# Patient Record
Sex: FEMALE | Race: WHITE | Employment: PART TIME | URBAN - METROPOLITAN AREA
[De-identification: names, ages, dates, MRNs, and addresses within clinical notes are randomized per-mention and may not be internally consistent; named-entity substitution may affect disease eponyms.]

---

## 2021-03-21 ENCOUNTER — HOSPITAL ENCOUNTER (EMERGENCY)
Age: 21
Discharge: HOME | End: 2021-03-21
Attending: EMERGENCY MEDICINE
Payer: COMMERCIAL

## 2021-03-21 ENCOUNTER — APPOINTMENT (OUTPATIENT)
Dept: GENERAL RADIOLOGY | Age: 21
End: 2021-03-21
Payer: COMMERCIAL

## 2021-03-21 VITALS
DIASTOLIC BLOOD PRESSURE: 80 MMHG | HEIGHT: 62 IN | OXYGEN SATURATION: 98 % | TEMPERATURE: 97.9 F | HEART RATE: 108 BPM | BODY MASS INDEX: 20.24 KG/M2 | RESPIRATION RATE: 20 BRPM | WEIGHT: 110 LBS | SYSTOLIC BLOOD PRESSURE: 132 MMHG

## 2021-03-21 DIAGNOSIS — S62.102A WRIST FRACTURE, CLOSED, LEFT, INITIAL ENCOUNTER: Primary | ICD-10-CM

## 2021-03-21 PROCEDURE — 6370000000 HC RX 637 (ALT 250 FOR IP): Performed by: EMERGENCY MEDICINE

## 2021-03-21 PROCEDURE — 99285 EMERGENCY DEPT VISIT HI MDM: CPT

## 2021-03-21 PROCEDURE — 73110 X-RAY EXAM OF WRIST: CPT

## 2021-03-21 RX ORDER — HYDROCODONE BITARTRATE AND ACETAMINOPHEN 5; 325 MG/1; MG/1
1 TABLET ORAL EVERY 6 HOURS PRN
Qty: 20 TABLET | Refills: 0 | Status: SHIPPED | OUTPATIENT
Start: 2021-03-21 | End: 2021-03-26

## 2021-03-21 RX ORDER — HYDROCODONE BITARTRATE AND ACETAMINOPHEN 5; 325 MG/1; MG/1
2 TABLET ORAL ONCE
Status: COMPLETED | OUTPATIENT
Start: 2021-03-21 | End: 2021-03-21

## 2021-03-21 RX ADMIN — HYDROCODONE BITARTRATE AND ACETAMINOPHEN 2 TABLET: 5; 325 TABLET ORAL at 02:59

## 2021-03-21 ASSESSMENT — ENCOUNTER SYMPTOMS
CHEST TIGHTNESS: 0
COUGH: 0
BACK PAIN: 0
SORE THROAT: 0
DIARRHEA: 0
SHORTNESS OF BREATH: 0
NAUSEA: 0
WHEEZING: 0
PHOTOPHOBIA: 0
VOMITING: 0
ABDOMINAL PAIN: 0
ABDOMINAL DISTENTION: 0
EYE DISCHARGE: 0

## 2021-03-21 ASSESSMENT — PAIN DESCRIPTION - LOCATION: LOCATION: WRIST

## 2021-03-21 ASSESSMENT — PAIN DESCRIPTION - FREQUENCY: FREQUENCY: CONTINUOUS

## 2021-03-21 ASSESSMENT — PAIN DESCRIPTION - PAIN TYPE: TYPE: ACUTE PAIN
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
## 2021-03-21 NOTE — ED NOTES
Written and verbal d/c instructions reviewed. Instructed on medications and f/u care. Verbalized understanding.       Rebeka Peterson RN  03/21/21 3737

## 2022-06-07 ENCOUNTER — OFFICE VISIT (OUTPATIENT)
Dept: SPORTS MEDICINE | Age: 22
End: 2022-06-07
Payer: COMMERCIAL

## 2022-06-07 VITALS
WEIGHT: 115 LBS | HEIGHT: 63 IN | DIASTOLIC BLOOD PRESSURE: 84 MMHG | SYSTOLIC BLOOD PRESSURE: 114 MMHG | TEMPERATURE: 97.1 F | BODY MASS INDEX: 20.38 KG/M2

## 2022-06-07 DIAGNOSIS — M47.816 SPONDYLOSIS OF LUMBAR REGION WITHOUT MYELOPATHY OR RADICULOPATHY: Primary | ICD-10-CM

## 2022-06-07 PROCEDURE — 99204 OFFICE O/P NEW MOD 45 MIN: CPT | Performed by: FAMILY MEDICINE

## 2022-06-07 RX ORDER — LIDOCAINE 50 MG/G
1 PATCH TOPICAL DAILY
Qty: 30 PATCH | Refills: 0 | Status: SHIPPED | OUTPATIENT
Start: 2022-06-07 | End: 2022-07-07

## 2022-06-07 RX ORDER — NORGESTIMATE AND ETHINYL ESTRADIOL 0.25-0.035
KIT ORAL
COMMUNITY
Start: 2022-05-10

## 2022-06-07 ASSESSMENT — ENCOUNTER SYMPTOMS
CONSTIPATION: 0
BACK PAIN: 0
NAUSEA: 0
SHORTNESS OF BREATH: 0
DIARRHEA: 0

## 2022-06-07 NOTE — PROGRESS NOTES
[facility] [address], [address] [facility]: [phone]  F: [phone]      Paula Noland  (2000)    6/7/2022    Subjective:     Paula Noland is 25 y.o. female who complains today of:    Chief Complaint   Patient presents with    Back Pain     Previous L4 fx. Having increased pain. HPI     Patient comes in stating that she has had back issues few years ago which turned out to be a L4 pars fracture we treated for her she is now getting pain again says she had a long period of time where she had no pain she not sure what starting it but is been going on for about a month to month and a half she denies any radicular symptoms associated  Allergies:  Penicillins    History reviewed. No pertinent past medical history. History reviewed. No pertinent surgical history. Family History   Problem Relation Age of Onset    Cancer Mother     Cancer Father      Social History     Socioeconomic History    Marital status: Single     Spouse name: Not on file    Number of children: Not on file    Years of education: Not on file    Highest education level: Not on file   Occupational History    Not on file   Tobacco Use    Smoking status: Never Smoker    Smokeless tobacco: Never Used   Substance and Sexual Activity    Alcohol use: Never    Drug use: Never    Sexual activity: Not on file   Other Topics Concern    Not on file   Social History Narrative    Not on file     Social Determinants of Health     Financial Resource Strain:     Difficulty of Paying Living Expenses: Not on file   Food Insecurity:     Worried About Running Out of Food in the Last Year: Not on file    Anastasia of Food in the Last Year: Not on file   Transportation Needs:     Lack of Transportation (Medical): Not on file    Lack of Transportation (Non-Medical):  Not on file   Physical Activity:     Days of Exercise per Week: Not on file    Minutes of Exercise per Session: Not on file   Stress:     Feeling of Stress : Not on file   Social Connections:     Frequency of Communication with Friends and Family: Not on file    Frequency of Social Gatherings with Friends and Family: Not on file    Attends Confucianism Services: Not on file    Active Member of [address] [place] [address] or Organizations: Not on file    Attends Club or Organization Meetings: Not on file    Marital Status: Not on file   Intimate Partner Violence:     Fear of Current or Ex-Partner: Not on file    Emotionally Abused: Not on file    Physically Abused: Not on file    Sexually Abused: Not on file   Housing Stability:     Unable to Pay for Housing in the Last Year: Not on file    Number of [place] in the Last Year: Not on file    Unstable Housing in the Last Year: Not on file       Current Outpatient Medications on File Prior to Visit   Medication Sig Dispense Refill    MONO-LINYAH 0.25-35 MG-MCG per tablet TAKE 1 TABLET BY MOUTH DAILY       No current facility-administered medications on file prior to visit. Review of Systems   Constitutional: Negative for fever. HENT: Negative for hearing loss. Respiratory: Negative for shortness of breath. Gastrointestinal: Negative for constipation, diarrhea and nausea. Genitourinary: Negative for difficulty urinating. Musculoskeletal: Negative for back pain and neck pain. Skin: Negative for rash. Neurological: Negative for headaches. Hematological: Does not bruise/bleed easily. Psychiatric/Behavioral: Negative for sleep disturbance. Objective:     Vitals:  /84 (Site: Left Upper Arm, Position: Sitting, Cuff Size: Medium Adult)   Temp 97.1 °F (36.2 °C) (Infrared)   Ht 5' 3\" (1.6 m)   Wt 115 lb (52.2 kg)   BMI 20.37 kg/m² Pain Score:   5      Physical Exam  Constitutional:       Appearance: Normal appearance. HENT:      Head: Normocephalic. Nose: No rhinorrhea.       Mouth/Throat: Pharynx: Oropharynx is clear. Eyes:      Pupils: Pupils are equal, round, and reactive to light. Cardiovascular:      Rate and Rhythm: Normal rate. Pulses: Normal pulses. Pulmonary:      Breath sounds: No wheezing. Abdominal:      Palpations: Abdomen is soft. Musculoskeletal:         General: No deformity. Cervical back: No rigidity. Lymphadenopathy:      Cervical: No cervical adenopathy. Skin:     General: Skin is warm and dry. Findings: No rash. Neurological:      Mental Status: She is alert and oriented to person, place, and time. Psychiatric:         Mood and Affect: Mood normal.         Behavior: Behavior normal.         Ortho Exam   Examination of the lumbar spine revealed the neurovascular muscular status to be within normal limits deep tendon reflexes were to 1/ 4 in the lower extremity there were no tension signs patient had near full range of motion palpable tenderness mostly L5-S1 equivocal and negative extension test    Reviewed x-rays of lumbar spine done today    Assessment:      Diagnosis Orders   1. Spondylosis of lumbar region without myelopathy or radiculopathy  XR LUMBAR SPINE (2-3 VIEWS)    Ambulatory referral to Physical Therapy    lidocaine (LIDODERM) 5 %       Plan:   Patient states she was sent for evaluation and treatment I want to start her on lidocaine patches and also physical therapy we will see her back in 3 weeks if she is no better consider further diagnostics       Orders Placed This Encounter   Medications    lidocaine (LIDODERM) 5 %     Sig: Place 1 patch onto the skin daily 12 hours on, 12 hours off.      Dispense:  30 patch     Refill:  0       Orders Placed This Encounter   Procedures    XR LUMBAR SPINE (2-3 VIEWS)     Standing Status:   Future     Standing Expiration Date:   6/7/2023    Ambulatory referral to Physical Therapy     Referral Priority:   Routine     Referral Type:   Eval and Treat     Referral Reason:   Specialty Services Required Requested Specialty:   Physical Therapist     Number of Visits Requested:   1         Follow up:  Return in about 3 weeks (around 6/28/2022).     LILIAN WALTERS, DO

## 2022-06-21 ENCOUNTER — HOSPITAL ENCOUNTER (OUTPATIENT)
Dept: PHYSICAL THERAPY | Age: 22
Setting detail: THERAPIES SERIES
Discharge: HOME | End: 2022-06-21
Payer: COMMERCIAL

## 2022-06-21 PROCEDURE — 97161 PT EVAL LOW COMPLEX 20 MIN: CPT

## 2022-06-21 PROCEDURE — G0283 ELEC STIM OTHER THAN WOUND: HCPCS

## 2022-06-21 PROCEDURE — 97110 THERAPEUTIC EXERCISES: CPT

## 2022-06-21 ASSESSMENT — PAIN DESCRIPTION - PAIN TYPE: TYPE: ACUTE PAIN

## 2022-06-21 ASSESSMENT — PAIN DESCRIPTION - ORIENTATION: ORIENTATION: LOWER;LEFT

## 2022-06-21 ASSESSMENT — PAIN DESCRIPTION - FREQUENCY: FREQUENCY: CONTINUOUS

## 2022-06-21 ASSESSMENT — PAIN DESCRIPTION - DESCRIPTORS: DESCRIPTORS: DULL;ACHING;SHARP

## 2022-06-21 ASSESSMENT — PAIN DESCRIPTION - LOCATION: LOCATION: BACK

## 2022-06-21 ASSESSMENT — PAIN SCALES - GENERAL: PAINLEVEL_OUTOF10: 4

## 2022-06-21 NOTE — PROGRESS NOTES
[facility]  PHYSICAL THERAPY EVALUATION      Physical Therapy: Initial Evaluation    Patient: Torrance Landau (82 y.o.     female)   Examination Date:   Plan of Care Certification Period: 2022 to    Progress Note Counter: 1/3-   :  2000 ;    Confirmed: Yes MRN: [id number]  CSN: [id number]   Insurance: Payor: ALLIED BENEFIT SYSTEM / Plan: ALLIED BENEFIT SYSTEM / Product Type: *No Product type* /   Insurance ID: [id number] - (Commercial) Secondary Insurance (if applicable):    Referring Physician: Rodolfo Ferrer*     PCP: Herve Garza Visits to Date/Visits Approved:     No Show/Cancelled Appts: 0 / 0     Medical Diagnosis: Spondylosis without myelopathy or radiculopathy, lumbar region [M47.816]    Treatment Diagnosis: low back pain, impaired bending and lifting activity tolerance, core stabilization weakness     PERTINENT MEDICAL HISTORY   Patient Assessed for Rehabilitation Services: Yes       Medical History: Chart Reviewed: Yes No past medical history on file. Surgical History: No past surgical history on file. Medications:   Current Outpatient Medications:     MONO-LINYAH 0.25-35 MG-MCG per tablet, TAKE 1 TABLET BY MOUTH DAILY, Disp: , Rfl:     lidocaine (LIDODERM) 5 %, Place 1 patch onto the skin daily 12 hours on, 12 hours off., Disp: 30 patch, Rfl: 0  Allergies: Penicillins      SUBJECTIVE EXAMINATION     History obtained from[de-identified] Patient,Chart Review,           Subjective History:    Subjective: Pt reports acute on chronic low back pain beginning 2022. Pt has hx of fracture L4 vertebrae that was managed with conservative healing via bracing. 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 Recent pain has impaired ability to ride her horse, perform lifting activity, and also withstand bending over to work with patients as dental assistant. Recent XR revealed no lumbar fracture.  Pt has been using lidocaine pateches PRN to help manage symptoms. Pain is a constant dull ache that increases with activity.   Additional Pertinent Hx (if applicable):  hx of L4 fracture   Prior diagnostic testing[de-identified] X-ray  Previous treatments prior to current episode?:  (lidocaine patches)      Learning/Language: Learning  Does the patient/guardian have any barriers to learning?: No barriers  What is the preferred language of the patient/guardian?: English  How does the patient/guardian prefer to learn new concepts?: Listening,Reading,Demonstration,Pictures/Videos     Pain Screening    Pain Screening  Patient Currently in Pain: Yes  Pain Assessment: 0-10  Pain Level: 4  Best Pain Level: 4  Worst Pain Level: 10  Pain Type: Acute pain  Pain Location: Back  Pain Orientation: Lower,Left  Pain Radiating Towards: occasionally down L leg  Pain Descriptors: Dull,Aching,Sharp  Pain Frequency: Continuous  Aggravating factors: Lifting,Carrying,Work duties  Pain Management/Relieving Factors: Ice    Functional Status    Social History:    Social History  Lives With: Family  Type of Home: House  Home Layout: One level    Occupation/Interests:   Occupation: Part time employment  Type of Occupation: Dental Asst  Job Duties: Prolonged sitting,Awkward positions  Leisure & Hobbies: riding horses, helping at the barn, [employer]    Prior Level of Function:   Independent    Current Level of Function:   ADL Assistance: Independent  Homemaking Assistance: Independent  Ambulation Assistance: Independent  Transfer Assistance: Independent  Active : Yes         OBJECTIVE EXAMINATION     Restrictions:   N/A    Review of Systems:  Vision: Within Functional Limits  Hearing: Within functional limits  Follows Commands: Within Functional Limits    VBI Screening / Lumbar Screening:    Bowel/bladder disturbances: No  Saddle anesthesia: No  Unexplained weight loss: No  Severe motor weakness: No  Stumbling or giving way while walking: No  Unrelenting pain at night: No    Palpation:   Lumbar Spine Palpation: tendernes throughout lumbar paraspinals    Left AROM  Right AROM                  General AROM LE: Right WNL,Left WNL    Left Strength  Right Strength      General Strength Testing LE: Right WNL,Left WNL  Strength LLE  L Hip Extension: 5/5  L Hip ABduction: 5/5  L Hip ADduction: 5/5  L Hip Internal Rotation: 5/5 General Strength Testing LE: Right WNL,Left WNL  Strength RLE  R Hip Flexion: 5/5  R Hip Extension: 5/5  R Hip ABduction: 5/5  R Hip ADduction: 5/5     Lumbar Assessment     AROM Lumbar Spine   Lumbar Spine AROM : Painful  Flexion: 100  Extension: 100  Lateral Flexion Right: 100  Lateral Flexion Left: 100  Right Rotation: 100  Left Rotation: 100   Hypermobility throughout lumbar spine        Trunk Strength     Trunk Strength  Trunk Flexion: 4+/5  Trunk Extension: 4-/5 (pain holding lumbar extension)  Prone Plank: 5-10 sec; mild pain  Right Side Plank: 10 sec  Left Side Plank: 10 sec     Outcomes Score:  Exam: JADE: 13/50    Treatment:    Exercises:   Exercises  Exercise 1: HEP: bird-dog, side plank (knees), plank (knees)  Exercise 2: *cont core stabilization strengthening  Exercise 3: *pallof presses  Exercise 4: *swiss ball prone walkouts  Exercise 5: *RDL variations  Exercise 6: *squat/lift variations  Treatment Reasoning  Limitations addressed: Strength,Activity tolerance,Pain modulation  Therapist provided: Verbal cuing,Manual cuing  Modalities:  Modalities: Yes  Cryotherapy (CPT 71224)  Patient Position: Seated  Number Minutes Cryotherapy: 10  Cryotherapy location: Low back  Post treatment skin assessment: Intact  Electric stimulation, unattended (CPT 47201) /  (Medicare)  Patient Position: Seated  E-stim location: Low back  E-stim type:  Interferential (IFC)  E-stim via: 4 Electrode Pads  Post treatment skin assessment: Intact     Manual:  Manual Therapy  Soft Tissue Mobilizaton: *lumbar paraspinals  Other: *cupping and IDN of lumbar paraspinals *Indicates exercise,modality, or manual techniques to be initiated when appropriate       ASSESSMENT     Impression: Assessment: Pt is a 26 yo female presenting with acute on chronic low back pain that impairs her ability to ride horses, perform farm/barn duties, and tolerate bending positions as a dental assistant. Pt displays lumbar hypermobility in all planes. Tenderness reported to light lumbar PA pressure and throughout romelia lumbar paraspinals. Pt displays good strength throughout her abdominals with mild pain and weakness throughout her lumbar paraspinals and obliques. No significant step off deformity identified. Pt can benefit from PT services to work on progress lumbar stabilization strengthening to improve bending/lifting activity. Modalities and soft tissue work to be incorporated for ongoing pain control and healing to further decrease ongoing discomfort. Body Structures, Functions, Activity Limitations Requiring Skilled Therapeutic Intervention: Increased pain,Decreased high-level IADLs    Statement of Medical Necessity: Physical Therapy is both indicated and medically necessary as outlined in the POC to increase the likelihood of meeting the functionally related goals stated below.      Patient's Activity Tolerance: Patient tolerated evaluation without incident mild increase in pain with lumbar stabilization exercises; decreased pain post modalities    Patient's rehabilitation potential/prognosis is considered to be: Good    Factors which may impact rehabilitation potential include: Profession/work barriers     Patient Education: PT [facility],Goals,Evaluative findings,Insurance,Home Exercise Program      GOALS   Patient Goal(s):      Short Term Goals Completed by 3 weeks Goal Status   Pt will report 2 point decrease in daily pain symptoms to </= 2/10 to improve functional activity tolerance New       Long Term Goals Completed by 6 weeks Goal Status   Decrease LBP to </= 1/10 with lifting activity up to 50# to 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 New   Core, back, and oblique strength 5/5 to improve spinal stabilization with lifting activity New   JADE score < 5/50 to demo improved daily function w/ decreased back pain limitation New   Pain free completion of HEP to continue functional exercises for onging benefit upon discharge. New          TREATMENT PLAN       Requires PT Follow-Up: Yes    Treatment may include any combination of the following: Strengthening,Functional mobility training,Neuromuscular re-education,Manual Therapy - Soft Tissue Mobilization,Manual Therapy - Joint Jackquelyn Negrete to work related activity,Home exercise program,Patient/Caregiver education & training,Modalities,Integrated dry needling     Frequency / Duration:  Patient to be seen 1 times per week for 6 weeks  Plan Comment:    anticipate PRN f/u to accommodate for pt's work schedule and out of pocket cost of attendance          Eval Complexity:   Decision Making: Low Complexity  History: Personal Factors and/or Comorbidities Impacting POC: Low  History: hx of L4 fracture  Examination of body system(s) including body structures and functions, activity limitations, and/or participation restrictions: Low  Exam: JADE: 13/50  Clinical Presentation: Medium  Clinical Presentation: evolving    POST-PAIN     Pain Rating (0-10 pain scale):  4 /10  Location and pain description same as pre-treatment unless indicated. Action: [] NA  [] Call Physician  [x] Perform HEP  [x] Meds as prescribed    Evaluation and patient rights have been reviewed and patient agrees with plan of care.   Yes  [x]  No  []   Explain:     Allen Fall Risk Assessment  Risk Factor Scale  Score   History of Falls [] Yes  [x] No 25  0    Secondary Diagnosis [] Yes  [x] No 15  0    Ambulatory Aid [] Furniture  [] Crutches/cane/walker  [x] None/bedrest/wheelchair/nurse 30  15  0    IV/Heparin Lock [] Yes  [x] No 20  0 Gait/Transferring [] Impaired  [] Weak  [x] Normal/bedrest/immobile 20  10  0    Mental Status [] Forgets limitations  [x] Oriented to own ability 15  0       Total: 0     Based on the Assessment score: check the appropriate box.   [x]  No intervention needed   Low =   Score of 0-24  []  Use standard prevention interventions Moderate =  Score of 24-44   [] Discuss fall prevention strategies   [] Indicate moderate falls risk on eval  []  Use high risk prevention interventions High = Score of 45 and higher   [] Discuss fall prevention strategies   [] Provide supervision during treatment time      Minutes:  PT Individual Minutes  Time In: 1520  Time Out: 1620  Minutes: 60  Timed Code Treatment Minutes: 10 Minutes  Procedure Minutes: 10 min modalities     Timed Activity Minutes Units   Ther Ex 10 1       Electronically signed by Bertha Sterling PT on 6/21/22 at 4:46 PM EDT

## 2022-06-21 NOTE — PROGRESS NOTES
[address]     Ph: [phone]  Fax: [phone]      [] Certification  [] Recertification [x]  Plan of Care  [] Progress Note [] Discharge      Referring Provider: Emily Hill DO      From:  [facility], PT   Patient: Jerry Gomez (25 y.o. female) : 2000 Date: 2022   Medical Diagnosis: Spondylosis without myelopathy or radiculopathy, lumbar region [M47.816]    Treatment Diagnosis: low back pain, impaired bending and lifting activity tolerance, core stabilization weakness    Progress Report Period from:  2022  to 2022    Visits to Date: 1 No Show: 0 Cancelled Appts: 0    OBJECTIVE:   Short Term Goals - Time Frame for Short term goals: 3 weeks    Goals Current/Discharge status  Status   Short term goal 1: Pt will report 2 point decrease in daily pain symptoms to </= 2/10 to improve functional activity tolerance  Pain Screening  Patient Currently in Pain: Yes  Pain Assessment: 0-10  Pain Level: 4  Best Pain Level: 4  Worst Pain Level: 10  Pain Type: Acute pain  Pain Location: Back  Pain Orientation: Lower,Left  Pain Radiating Towards: occasionally down L leg  Pain Descriptors: Dull,Aching,Sharp  Pain Frequency: Continuous  Aggravating factors: Lifting,Carrying,Work duties  Pain Management/Relieving Factors: Ice     New     Long Term Goals - Time Frame for Long term goals : 6 weeks  Goals Current/ Discharge status Status   Long term goal 1: Decrease LBP to </= 1/10 with lifting activity up to 50# to 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  Pain Screening  Patient Currently in Pain: Yes  Pain Assessment: 0-10  Pain Level: 4  Best Pain Level: 4  Worst Pain Level: 10  Pain Type: Acute pain  Pain Location: Back  Pain Orientation: Lower,Left  Pain Radiating Towards: occasionally down L leg  Pain Descriptors: Dull,Aching,Sharp  Pain Frequency: Continuous  Aggravating factors: Lifting,Carrying,Work duties  Pain Management/Relieving Factors: Ice     New   Long term goal 2: Core, back, and oblique strength 5/5 to improve spinal stabilization with lifting activity Strength LLE  L Hip Extension: 5/5  L Hip ABduction: 5/5  L Hip ADduction: 5/5  L Hip Internal Rotation: 5/5  Strength RLE  R Hip Flexion: 5/5  R Hip Extension: 5/5  R Hip ABduction: 5/5  R Hip ADduction: 5/5      Trunk Strength  Trunk Flexion: 4+/5  Trunk Extension: 4-/5 (pain holding lumbar extension)  Prone Plank: 5-10 sec; mild pain  Right Side Plank: 10 sec  Left Side Plank: 10 sec  General Strength Testing LE: Right WNL,Left WNL   New   Long term goal 3: JADE score < 5/50 to demo improved daily function w/ decreased back pain limitation Exam: JADE: 13/50     New   Long term goal 4: Pain free completion of HEP to continue functional exercises for onging benefit upon discharge. 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 Pt displays lumbar hypermobility in all planes. Tenderness reported to light lumbar PA pressure and throughout romelia lumbar paraspinals. Pt displays good strength throughout her abdominals with mild pain and weakness throughout her lumbar paraspinals and obliques. No significant step off deformity identified. Pt can benefit from PT services to work on progressive lumbar stabilization strengthening to improve bending/lifting activity. Modalities and soft tissue work to be incorporated for ongoing pain control and healing to further decrease ongoing discomfort. Therapy Prognosis: Good      PT Education: PT Role;Plan of Care;Goals; Evaluative findings; Insurance;Home Exercise Program    PLAN: [x] Evaluate and Treat  Frequency/Duration:  Plan Frequency: 1  Plan weeks: 6  Current Treatment Recommendations: Strengthening,Functional mobility training,Neuromuscular re-education,Manual Therapy - Soft Tissue Mobilization,Manual Therapy - Joint Manipulation,Pain management,Return to work related activity,Home exercise program,Patient/Caregiver education & training,Modalities,Integrated dry needling  Plan Comment: anticipate PRN f/u to accommodate for pt's work schedule and out of pocket cost of attendance     Precautions:    n/a                        Patient Status:[x] Continue/ Initiate plan of Care    [] Discharge PT. Recommend pt continue with HEP. [] Additional visits requested, Please re-certify for additional visits:    [] Hold         Signature: Electronically signed by Funmilayo Godinez PT on 6/21/22 at 4:51 PM EDT      If you have any questions or concerns, please don't hesitate to call. Thank you for your referral.    I have reviewed this plan of care and certify a need for medically necessary rehabilitation services.     Physician Signature:__________________________________________________________  Date:  Please sign and return

## 2022-06-29 ENCOUNTER — HOSPITAL ENCOUNTER (OUTPATIENT)
Dept: PHYSICAL THERAPY | Age: 22
Setting detail: THERAPIES SERIES
Discharge: HOME | End: 2022-06-29
Payer: COMMERCIAL

## 2022-06-29 PROCEDURE — 97140 MANUAL THERAPY 1/> REGIONS: CPT

## 2022-06-29 PROCEDURE — 97110 THERAPEUTIC EXERCISES: CPT

## 2022-06-29 NOTE — PROGRESS NOTES
[facility]  Outpatient Physical Therapy    Treatment Note        Date: 2022  Patient: Mike Henderson  : 2000   Confirmed: Yes  MRN: [id number]  Referring Provider: Yulissa Rae*   Secondary Referring Provider (If applicable):     Medical Diagnosis: Spondylosis without myelopathy or radiculopathy, lumbar region [M47.816]    Treatment Diagnosis: low back pain, impaired bending and lifting activity tolerance, core stabilization weakness    Visit Information:  Insurance: Payor: ALLIED BENEFIT SYSTEM / Plan: ALLIED BENEFIT SYSTEM / Product Type: *No Product type* /   PT Visit Information  PT Insurance Information: [employer] System  Total # of Visits Approved: 30  Total # of Visits to Date: 2  No Show: 0  Canceled Appointment: 0  Progress Note Counter: 2/3-    Subjective Information:  Subjective: Pt reports compliance with HEP. States she is only able to complete side planks on her knees but can straighten legs  HEP Compliance:  [x] Good [] Fair [] Poor [] Reports not doing due to:    Pain Screening  Patient Currently in Pain: No    Treatment:  Exercises:  Exercises  Exercise 2: Bird-dog 5s x10  Exercise 4: Pball: opp UE/LE lift 3s x10 romelia  Exercise 7: Side plank with knees flexed 10s x2 romelia, plank in prone with legs extended 10s x2       Manual:   Manual Therapy  Muscle Energy: MET to correct pelvic alignment, Rt leg pulls with IR  Soft Tissue Mobilizaton: STM to romelia lumbar paraspinals  Other: IDN benefits, risks, and procedures, and provided written handout of HEP and educational info for [employer]; IDN consent form signed, IDN/Education (6 mins) to normalize inflammation, dec pain and provide improved biomechanics ( see DN diagram for sites needled that will be scanned into EMR upon D/C)     Assessment:    Body Structures, Functions, Activity Limitations Requiring Skilled Therapeutic Intervention: Increased pain,Decreased high-level IADLs  Assessment: Initiated IDN with manual to improve tissue extensibility and reduce LBP. Pt demonstrates an elevated Rt iliac crest, Rt PSIS and depressed Rt ASIS possibly contributing to her pain and causing deviations with completing exercises. Completed MET to correct pelvic alignment and initiated hip strengthening to improve pelvic stability. Treatment Diagnosis: low back pain, impaired bending and lifting activity tolerance, core stabilization weakness  Therapy Prognosis: Good     Activity Tolerance  Activity Tolerance: Patient tolerated treatment well    Post-Pain Assessment:       Pain Rating (0-10 pain scale):   0/10   Location and pain description same as pre-treatment unless indicated. 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 In progress   LTG 2 Core, back, and oblique strength 5/5 to improve spinal stabilization with lifting activity In progress   LTG 3 JADE score < 5/50 to demo improved daily function w/ decreased back pain limitation In progress   LTG 4 Pain free completion of HEP to continue functional exercises for onging benefit upon discharge.  In progress            Plan:  Frequency/Duration:  Plan  Plan Frequency: 1  Plan weeks: 6  Current Treatment Recommendations: Strengthening,Functional mobility training,Neuromuscular re-education,Manual Therapy - Soft Tissue Mobilization,Manual Therapy - Joint Gabriela Fee to work related activity,Home exercise program,Patient/Caregiver education & training,Modalities,Integrated dry needling  Plan Comment: anticipate PRN f/u to accommodate for pt's work schedule and out of pocket cost of attendance  Pt to continue current HEP. See objective section for any therapeutic exercise changes, additions or modifications this date.     Therapy Time:      PT Individual Minutes  Time In: 0803  Time Out: 6007  Minutes: 39  Timed Code Treatment Minutes: 38 Minutes  Procedure Minutes:0  Timed Activity Minutes Units   Ther Ex 13 1   Manual  25 2     Electronically signed by Sarmad Rosenberg PT on 6/29/22 at 11:09 AM EDT

## 2022-07-08 ENCOUNTER — HOSPITAL ENCOUNTER (OUTPATIENT)
Dept: PHYSICAL THERAPY | Age: 22
Setting detail: THERAPIES SERIES
Discharge: HOME | End: 2022-07-08
Payer: COMMERCIAL

## 2022-07-08 PROCEDURE — 97140 MANUAL THERAPY 1/> REGIONS: CPT

## 2022-07-08 PROCEDURE — 97110 THERAPEUTIC EXERCISES: CPT

## 2022-07-08 ASSESSMENT — PAIN SCALES - GENERAL: PAINLEVEL_OUTOF10: 8

## 2022-07-08 ASSESSMENT — PAIN DESCRIPTION - ORIENTATION: ORIENTATION: LOWER;RIGHT;LEFT

## 2022-07-08 ASSESSMENT — PAIN DESCRIPTION - LOCATION: LOCATION: BACK

## 2022-07-08 NOTE — PROGRESS NOTES
Tita Purvis to work related activity,Home exercise program,Patient/Caregiver education & training,Modalities,Integrated dry needling  Plan Comment: anticipate PRN f/u to accommodate for pt's work schedule and out of pocket cost of attendance  Pt to continue current HEP. See objective section for any therapeutic exercise changes, additions or modifications this date.     Therapy Time:      PT Individual Minutes  Time In: 5144  Time Out: 1101  Minutes: 29  Timed Code Treatment Minutes: 29 Minutes  Procedure Minutes:0  Timed Activity Minutes Units   Ther Ex 9 1   Manual  20 1     Electronically signed by Bonnie Lozano PT on 7/8/22 at 4:08 PM EDT

## 2022-07-13 ENCOUNTER — HOSPITAL ENCOUNTER (OUTPATIENT)
Dept: PHYSICAL THERAPY | Age: 22
Setting detail: THERAPIES SERIES
Discharge: HOME | End: 2022-07-13
Payer: COMMERCIAL

## 2022-07-13 PROCEDURE — 97140 MANUAL THERAPY 1/> REGIONS: CPT

## 2022-07-13 PROCEDURE — 97110 THERAPEUTIC EXERCISES: CPT

## 2022-07-13 ASSESSMENT — PAIN DESCRIPTION - ORIENTATION: ORIENTATION: RIGHT;LEFT;LOWER

## 2022-07-13 ASSESSMENT — PAIN SCALES - GENERAL: PAINLEVEL_OUTOF10: 3

## 2022-07-13 ASSESSMENT — PAIN DESCRIPTION - FREQUENCY: FREQUENCY: CONTINUOUS

## 2022-07-13 ASSESSMENT — PAIN DESCRIPTION - DESCRIPTORS: DESCRIPTORS: DULL;ACHING;SHARP

## 2022-07-13 ASSESSMENT — PAIN DESCRIPTION - LOCATION: LOCATION: BACK

## 2022-07-13 ASSESSMENT — PAIN DESCRIPTION - PAIN TYPE: TYPE: ACUTE PAIN

## 2022-07-13 NOTE — PROGRESS NOTES
[facility]  Outpatient Physical Therapy    Treatment Note        Date: 2022  Patient: Nancy Chamberlain  : 2000   Confirmed: Yes  MRN: [id number]  Referring Provider: Radha Lara*   Secondary Referring Provider (If applicable):     Medical Diagnosis: Spondylosis without myelopathy or radiculopathy, lumbar region [M47.816]    Treatment Diagnosis: low back pain, impaired bending and lifting activity tolerance, core stabilization weakness    Visit Information:  Insurance: Payor: [employer] / Plan: [employer] / Product Type: *No Product type* /   PT Visit Information  Total # of Visits Approved: 30  Total # of Visits to Date: 4  No Show: 0  Canceled Appointment: 0  Progress Note Counter: 4/3-    Subjective Information:  Subjective: Patient reports improvement in pain following IDN for ~1 week. Patient reports compliance with HEP.   HEP Compliance:  [x] Good [] Fair [] Poor [] Reports not doing due to:    Pain Screening  Patient Currently in Pain: Yes  Pain Level: 3  Pain Type: Acute pain  Pain Location: Back  Pain Orientation: Right,Left,Lower  Pain Descriptors: Dull,Aching,Sharp  Pain Frequency: Continuous    Treatment:  Exercises:  Exercises  Exercise 3: pallof presses with GTB, 2 sets x 20 reps each LE  Exercise 9: education on self-mobilization of anterior innominate (left)* handout provided  Exercise 10: lower abdominals crunch with ball squeeze between knees, 2 sets x 20 reps  Exercise 11: upper abdominals crunch with ball overhead, 8# weighted ball, 2 sets x 20 reps  Exercise 20: HEP: continue with current + crunches     Manual:   Manual Therapy  Other: cupping to bilateral lumbar paraspinals to decrease pain/inflammation/muscle tightness, 4 medium/large sized cups each side, ~2-3 minutes static hold, education provided on benefits/risks/procedure as well as home cupping with over-the-counter purchase, x8 minutes total    *Indicates exercise, modality, or manual techniques to be initiated when appropriate    Objective Measures:     Strength: [x] NT  [] MMT completed:    ROM: [x] NT  [] ROM measurements:    Assessment: Body Structures, Functions, Activity Limitations Requiring Skilled Therapeutic Intervention: Increased pain,Decreased high-level IADLs  Assessment: Continued with core & trunk higher level strengthening exercises once pelvic alignment checked & determined to be \"normal.\"  Education provided to patient on cupping on her own if she finds relief with manual technique this date. Education provided to patient about self-mobilization of pelvic alignment if required at home. No compensation noted this date with patient verbally aware of keeping herself in alignment as educated by therapist in prior session. Improvement in pain post-treatment session. Patient requesting up to 30 day hold from outpatient PT at this time in order to trial HEP on her own. Treatment Diagnosis: low back pain, impaired bending and lifting activity tolerance, core stabilization weakness  Therapy Prognosis: Good    Post-Pain Assessment:       Pain Rating (0-10 pain scale):  \"better\" /10   Location and pain description same as pre-treatment unless indicated. 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  In progress   LTG 2 Core, back, and oblique strength 5/5 to improve spinal stabilization with lifting activity In progress   LTG 3 JADE score < 5/50 to demo improved daily function w/ decreased back pain limitation In progress   LTG 4 Pain free completion of HEP to continue functional exercises for onging benefit upon discharge. In progress     Plan:  Frequency/Duration:  Plan  Plan Frequency: 1  Plan weeks: 6  Current Treatment Recommendations: Strengthening,Functional mobility training,Neuromuscular re-education,Manual Therapy - Soft Tissue Mobilization,Manual Therapy - Joint Zulma Durham to work related activity,Home exercise program,Patient/Caregiver education & training,Modalities,Integrated dry needling  Plan Comment: anticipate PRN f/u to accommodate for pt's work schedule and out of pocket cost of attendance  Pt to continue current HEP. See objective section for any therapeutic exercise changes, additions or modifications this date.     Therapy Time:      PT Individual Minutes  Time In: 0815  Time Out: 0900  Minutes: 45  Timed Code Treatment Minutes: 45 Minutes  Procedure Minutes: 0 minutes  Timed Activity Minutes Units   Ther Ex 37 2   Manual  8 1     Electronically signed by Rosemarie Cardona PT on 7/13/22 at 9:19 AM EDT

## 2022-07-19 ENCOUNTER — HOSPITAL ENCOUNTER (OUTPATIENT)
Dept: ULTRASOUND IMAGING | Age: 22
Discharge: HOME | End: 2022-07-21
Payer: COMMERCIAL

## 2022-07-19 ENCOUNTER — OFFICE VISIT (OUTPATIENT)
Dept: FAMILY MEDICINE CLINIC | Age: 22
End: 2022-07-19
Payer: COMMERCIAL

## 2022-07-19 VITALS
SYSTOLIC BLOOD PRESSURE: 116 MMHG | DIASTOLIC BLOOD PRESSURE: 68 MMHG | HEART RATE: 60 BPM | OXYGEN SATURATION: 100 % | TEMPERATURE: 98 F | BODY MASS INDEX: 20.02 KG/M2 | WEIGHT: 113 LBS | HEIGHT: 63 IN

## 2022-07-19 DIAGNOSIS — R10.32 LEFT LOWER QUADRANT ABDOMINAL PAIN: Primary | ICD-10-CM

## 2022-07-19 DIAGNOSIS — G43.909 MIGRAINE WITHOUT STATUS MIGRAINOSUS, NOT INTRACTABLE, UNSPECIFIED MIGRAINE TYPE: ICD-10-CM

## 2022-07-19 DIAGNOSIS — R10.32 LEFT LOWER QUADRANT ABDOMINAL PAIN: ICD-10-CM

## 2022-07-19 LAB
BILIRUBIN, POC: NORMAL
BLOOD URINE, POC: NORMAL
CLARITY, POC: CLEAR
COLOR, POC: YELLOW
CONTROL: NORMAL
GLUCOSE URINE, POC: NORMAL
KETONES, POC: NORMAL
LEUKOCYTE EST, POC: NORMAL
NITRITE, POC: NORMAL
PH, POC: 6
PREGNANCY TEST URINE, POC: NORMAL
PROTEIN, POC: NORMAL
SPECIFIC GRAVITY, POC: 1.03
UROBILINOGEN, POC: NORMAL

## 2022-07-19 PROCEDURE — 76830 TRANSVAGINAL US NON-OB: CPT

## 2022-07-19 PROCEDURE — 81025 URINE PREGNANCY TEST: CPT | Performed by: PHYSICIAN ASSISTANT

## 2022-07-19 PROCEDURE — 76856 US EXAM PELVIC COMPLETE: CPT

## 2022-07-19 PROCEDURE — 81002 URINALYSIS NONAUTO W/O SCOPE: CPT | Performed by: PHYSICIAN ASSISTANT

## 2022-07-19 PROCEDURE — 99213 OFFICE O/P EST LOW 20 MIN: CPT | Performed by: PHYSICIAN ASSISTANT

## 2022-07-19 RX ORDER — VALACYCLOVIR HYDROCHLORIDE 500 MG/1
TABLET, FILM COATED ORAL
COMMUNITY
Start: 2022-07-04

## 2022-07-19 SDOH — ECONOMIC STABILITY: FOOD INSECURITY: WITHIN THE PAST 12 MONTHS, YOU WORRIED THAT YOUR FOOD WOULD RUN OUT BEFORE YOU GOT MONEY TO BUY MORE.: NEVER TRUE

## 2022-07-19 SDOH — ECONOMIC STABILITY: FOOD INSECURITY: WITHIN THE PAST 12 MONTHS, THE FOOD YOU BOUGHT JUST DIDN'T LAST AND YOU DIDN'T HAVE MONEY TO GET MORE.: NEVER TRUE

## 2022-07-19 ASSESSMENT — ENCOUNTER SYMPTOMS
VOMITING: 0
HEMATOCHEZIA: 0
SORE THROAT: 0
EYE WATERING: 0
SCALP TENDERNESS: 0
SINUS PRESSURE: 0
FACIAL SWEATING: 0
NAUSEA: 1
BACK PAIN: 0
RHINORRHEA: 0
EYE REDNESS: 0
PHOTOPHOBIA: 1
BLURRED VISION: 1
CONSTIPATION: 0
COUGH: 0
ABDOMINAL PAIN: 1
VISUAL CHANGE: 0
SWOLLEN GLANDS: 0
FLATUS: 0
BELCHING: 0
DIARRHEA: 0
EYE PAIN: 0

## 2022-07-19 ASSESSMENT — PATIENT HEALTH QUESTIONNAIRE - PHQ9
2. FEELING DOWN, DEPRESSED OR HOPELESS: 0
1. LITTLE INTEREST OR PLEASURE IN DOING THINGS: 0
SUM OF ALL RESPONSES TO PHQ QUESTIONS 1-9: 0
SUM OF ALL RESPONSES TO PHQ9 QUESTIONS 1 & 2: 0
SUM OF ALL RESPONSES TO PHQ QUESTIONS 1-9: 0

## 2022-07-19 ASSESSMENT — SOCIAL DETERMINANTS OF HEALTH (SDOH): HOW HARD IS IT FOR YOU TO PAY FOR THE VERY BASICS LIKE FOOD, HOUSING, MEDICAL CARE, AND HEATING?: NOT HARD AT ALL

## 2022-07-19 ASSESSMENT — VISUAL ACUITY: OU: 1

## 2022-07-19 NOTE — PROGRESS NOTES
[address] [facility] Encounter  CHIEF COMPLAINT       Chief Complaint   Patient presents with    Other     Pt states she has been getting migraines and sharp pain in left lower abdomen pt states that she cant stand after she get the pain. HISTORY OF PRESENT ILLNESS   Chris Wilcox is a 25 y.o. female who presents with:  Migraine   This is a new problem. The problem occurs constantly. The problem has been unchanged. The pain is located in the Right unilateral region. The quality of the pain is described as aching. Associated symptoms include abdominal pain, blurred vision, nausea and photophobia. Pertinent negatives include no abnormal behavior, anorexia, back pain, coughing, dizziness, drainage, ear pain, eye pain, eye redness, eye watering, facial sweating, fever, hearing loss, insomnia, loss of balance, muscle aches, neck pain, numbness, phonophobia, rhinorrhea, scalp tenderness, seizures, sinus pressure, sore throat, swollen glands, tingling, tinnitus, visual change, vomiting, weakness or weight loss. Nothing aggravates the symptoms. She has tried Excedrin and NSAIDs for the symptoms. The treatment provided mild relief. There is no history of cancer, cluster headaches, hypertension, immunosuppression, migraine headaches, migraines in the family, obesity, pseudotumor cerebri, recent head traumas, sinus disease or TMJ. Abdominal Pain  This is a new problem. Episode onset: Daryn morning. Has improved. The onset quality is sudden. The problem is unchanged. The pain is located in the LLQ. The pain is moderate. The quality of the pain is described as cramping. The pain does not radiate. Associated symptoms include headaches and nausea. Pertinent negatives include no anorexia, anxiety, arthralgias, belching, constipation, diarrhea, dysuria, fever, flatus, frequency, hematochezia, hematuria, melena, myalgias, rash, sore throat or vomiting. Nothing relieves the symptoms.  Past treatments include nothing. There is no past medical history of chronic gastrointestinal disease, GERD, recent abdominal injury or a UTI. No vaginal bleeding or UTI symptoms. Patient is on OCP. No nausea or vomiting. REVIEW OF SYSTEMS     Review of Systems   Constitutional:  Negative for fever and weight loss. HENT:  Negative for ear pain, hearing loss, rhinorrhea, sinus pressure, sore throat and tinnitus. Eyes:  Positive for blurred vision and photophobia. Negative for pain and redness. Respiratory:  Negative for cough. Gastrointestinal:  Positive for abdominal pain and nausea. Negative for anorexia, constipation, diarrhea, flatus, hematochezia, melena and vomiting. Genitourinary:  Negative for dysuria, frequency and hematuria. Musculoskeletal:  Negative for arthralgias, back pain, myalgias and neck pain. Skin:  Negative for rash. Neurological:  Positive for headaches. Negative for dizziness, tingling, seizures, weakness, numbness and loss of balance. Psychiatric/Behavioral:  The patient is not nervous/anxious and does not have insomnia. PAST MEDICAL HISTORY   History reviewed. No pertinent past medical history. SURGICAL HISTORY     Patient  has no past surgical history on file. CURRENT MEDICATIONS       Previous Medications    MONO-LINYAH 0.25-35 MG-MCG PER TABLET    TAKE 1 TABLET BY MOUTH DAILY    VALACYCLOVIR (VALTREX) 500 MG TABLET    TAKE 1 TABLET BY MOUTH DAILY     ALLERGIES     Patient is is allergic to penicillins. FAMILY HISTORY     Patient'sfamily history includes Cancer in her father and mother. SOCIAL HISTORY     Patient  reports that she has never smoked. She has never used smokeless tobacco. She reports that she does not drink alcohol and does not use drugs. PHYSICAL EXAM     VITALS  BP: 116/68, Temp: 98 °F (36.7 °C), Heart Rate: 60,  , SpO2: 100 %  Physical Exam  Vitals and nursing note reviewed. Constitutional:       General: She is awake.  She is not in acute distress. Appearance: Normal appearance. She is well-developed. She is not ill-appearing, toxic-appearing or diaphoretic. HENT:      Head: Normocephalic and atraumatic. Right Ear: Hearing, tympanic membrane, ear canal and external ear normal.      Left Ear: Hearing, tympanic membrane, ear canal and external ear normal.      Nose: Nose normal.   Eyes:      General: Lids are normal. Vision grossly intact. Gaze aligned appropriately. Conjunctiva/sclera: Conjunctivae normal.   Cardiovascular:      Rate and Rhythm: Normal rate and regular rhythm. Pulses: Normal pulses. Heart sounds: Normal heart sounds, S1 normal and S2 normal.   Pulmonary:      Effort: Pulmonary effort is normal.      Breath sounds: Normal breath sounds and air entry. Abdominal:      General: Abdomen is flat. Bowel sounds are normal.      Palpations: Abdomen is soft. Tenderness: There is abdominal tenderness in the left lower quadrant. There is no right CVA tenderness, left CVA tenderness or guarding. Negative signs include McBurney's sign. Hernia: No hernia is present. Musculoskeletal:      Cervical back: Normal range of motion. Skin:     General: Skin is warm. Capillary Refill: Capillary refill takes less than 2 seconds. Neurological:      Mental Status: She is alert and oriented to person, place, and time. Gait: Gait is intact. Psychiatric:         Attention and Perception: Attention normal.         Mood and Affect: Mood normal.         Speech: Speech normal.         Behavior: Behavior normal. Behavior is cooperative.      READY CARE COURSE   Labs:  Results for POC orders placed in visit on 07/19/22   POCT urine pregnancy   Result Value Ref Range    Preg Test, Ur NEG     Control Pass    POCT Urinalysis no Micro   Result Value Ref Range    Color, UA Yellow     Clarity, UA Clear     Glucose, UA POC -     Bilirubin, UA -     Ketones, UA -     Spec Grav, UA 1.030     Blood, UA POC -     pH, UA 6.0 Protein, UA POC -     Urobilinogen, UA -     Leukocytes, UA -     Nitrite, UA -      IMAGING:  US NON OB TRANSVAGINAL    (Results Pending)     Scheduled Meds:  Continuous Infusions:  PRN Meds:. PROCEDURES:  FINAL IMPRESSION      1. Left lower quadrant abdominal pain    2. Migraine without status migrainosus, not intractable, unspecified migraine type      DISPOSITION/PLAN   POCT urine negative. Patient has LLQ pain. Possible ovarian cysts. Patient is still eating and drinking. No nausea or vomiting. No change in bowel movements or blood in stools. Lower likelihood for diverticulitis. Recommend KUB and Ultrasound for further evaluation. Patient has close follow up with pcp on 07/21. If everything is negative, recommend possible CT scan. Migraine like symptoms. Had headaches in the past. Patient does not smoke. She is on OCP. May need to reconsider type of contraceptives. Informed patient to keep diary of migraine in order to better facilitate management of migraines. May continue Excidrin. May qualify for nurtec samples. Patient is not having and one-sided weakness. Discussed signs and symptoms which require immediate follow-up in ED/call to 911. Patient verbalized understanding. Patient left in stable condition. VSS. On this date 7/19/2022 I have spent 20 minutes reviewing previous notes, test results and face to face with the patient discussing the diagnosis and importance of compliance with the treatment plan as well as documenting on the day of the visit. PATIENT REFERRED TO:  Return if symptoms worsen or fail to improve. DISCHARGE MEDICATIONS:  New Prescriptions    No medications on file     Cannot display discharge medications since this is not an admission.        Adrian Gibbons

## 2022-07-25 ENCOUNTER — OFFICE VISIT (OUTPATIENT)
Dept: SPORTS MEDICINE | Age: 22
End: 2022-07-25
Payer: COMMERCIAL

## 2022-07-25 VITALS
WEIGHT: 113 LBS | TEMPERATURE: 96.9 F | HEIGHT: 63 IN | BODY MASS INDEX: 20.02 KG/M2 | SYSTOLIC BLOOD PRESSURE: 120 MMHG | DIASTOLIC BLOOD PRESSURE: 76 MMHG

## 2022-07-25 DIAGNOSIS — M47.816 SPONDYLOSIS OF LUMBAR REGION WITHOUT MYELOPATHY OR RADICULOPATHY: Primary | ICD-10-CM

## 2022-07-25 PROCEDURE — 99213 OFFICE O/P EST LOW 20 MIN: CPT | Performed by: FAMILY MEDICINE

## 2022-07-25 ASSESSMENT — ENCOUNTER SYMPTOMS
DIARRHEA: 0
SHORTNESS OF BREATH: 0
CONSTIPATION: 0
NAUSEA: 0
BACK PAIN: 0

## 2022-07-25 NOTE — PROGRESS NOTES
[facility]) Physicians  Neurosurgery and Pain [facility]  [address], [address] [facility] , [address] [facility], [place]: [phone]  F: [phone]      Treasure Marinelli  (2000)    7/25/2022    Subjective:     Treasure Marinelli is 25 y.o. female who complains today of:    Chief Complaint   Patient presents with    Follow-up     Review Lumbar XR - Patient reports decreased pain with physical therapy       HPI     Patient returns stating that she is feeling a lot better she is almost done with therapy and she feels it is helped her quite a bit  Allergies:  Penicillins    History reviewed. No pertinent past medical history. History reviewed. No pertinent surgical history.   Family History   Problem Relation Age of Onset    Cancer Mother     Cancer Father      Social History     Socioeconomic History    Marital status: Single     Spouse name: Not on file    Number of children: Not on file    Years of education: Not on file    Highest education level: Not on file   Occupational History    Not on file   Tobacco Use    Smoking status: Never    Smokeless tobacco: Never   Substance and Sexual Activity    Alcohol use: Never    Drug use: Never    Sexual activity: Not on file   Other Topics Concern    Not on file   Social History Narrative    Not on file     Social Determinants of Health     Financial Resource Strain: Low Risk     Difficulty of Paying Living Expenses: Not hard at all   Food Insecurity: No Food Insecurity    Worried About Running Out of Food in the Last Year: Never true    Ran Out of Food in the Last Year: Never true   Transportation Needs: Not on file   Physical Activity: Not on file   Stress: Not on file   Social Connections: Not on file   Intimate Partner Violence: Not on file   Housing Stability: Not on file       Current Outpatient Medications on File Prior to Visit   Medication Sig Dispense Refill    valACYclovir (VALTREX) 500 MG tablet TAKE 1 TABLET BY MOUTH DAILY MONO-LINYAH 0.25-35 MG-MCG per tablet TAKE 1 TABLET BY MOUTH DAILY       No current facility-administered medications on file prior to visit. Review of Systems   Constitutional:  Negative for fever. HENT:  Negative for hearing loss. Respiratory:  Negative for shortness of breath. Gastrointestinal:  Negative for constipation, diarrhea and nausea. Genitourinary:  Negative for difficulty urinating. Musculoskeletal:  Negative for back pain and neck pain. Skin:  Negative for rash. Neurological:  Negative for headaches. Hematological:  Does not bruise/bleed easily. Psychiatric/Behavioral:  Negative for sleep disturbance. Objective:     Vitals:  /76 (Site: Left Upper Arm, Position: Sitting, Cuff Size: Medium Adult)   Temp 96.9 °F (36.1 °C) (Infrared)   Ht 5' 3\" (1.6 m)   Wt 113 lb (51.3 kg)   BMI 20.02 kg/m² Pain Score:   0 - No pain      Physical Exam  Vitals reviewed. Constitutional:       Appearance: Normal appearance. Skin:     General: Skin is warm and dry. Neurological:      Mental Status: She is alert. Ortho Exam   Examination of the lumbar spine reveals the neurovascular muscular status to be intact patient has full range of motion no palpable tenderness no tension signs negative with negative extension test    Assessment:      Diagnosis Orders   1. Spondylosis of lumbar region without myelopathy or radiculopathy            Plan:   Patient doing well I encouraged her to finish her PT and continue on a home exercise program return to clinic as needed       No orders of the defined types were placed in this encounter. No orders of the defined types were placed in this encounter. Follow up:  Return if symptoms worsen or fail to improve.     LILIAN WALTERS DO

## 2022-09-21 NOTE — PROGRESS NOTES
[address] [facility], [address]                                  Ph: [phone]  Fax: [phone]        [] Certification  [] Recertification     []  Plan of Care  [] Progress Note [x] Discharge                            Referring Provider: Isak Hair DO                      From:  Nohemy Bolanos, PT   Patient: Teresa Collins (25 y.o. female) : 2000 Date: 2022   Medical Diagnosis: Spondylosis without myelopathy or radiculopathy, lumbar region [M47.816]    Treatment Diagnosis: low back pain, impaired bending and lifting activity tolerance, core stabilization weakness     Progress Report Period from:  2022  to 2022     Visits to Date: 4 No Show: 0 Cancelled Appts: 0     OBJECTIVE:   Short Term Goals - Time Frame for Short term goals: 3 weeks    Goals Current/Discharge status  Status   Short term goal 1: Pt will report 2 point decrease in daily pain symptoms to </= 2/10 to improve functional activity tolerance     pain variable, ranging 3-8/10 intensity at completed visits          Long Term Goals - Time Frame for Long term goals : 6 weeks  Goals Current/ Discharge status Status   Long term goal 1: Decrease LBP to </= 1/10 with lifting activity up to 50# to 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 See above    Long term goal 2: Core, back, and oblique strength 5/5 to improve spinal stabilization with lifting activity   HEP for ongoing strengthening    Long term goal 3: JADE score < 5/50 to demo improved daily function w/ decreased back pain limitation   N/T prior to requested discharge    Long term goal 4: Pain free completion of HEP to continue functional exercises for onging benefit upon discharge.    HEP provided          Body Structures, Functions, Activity Limitations Requiring Skilled Therapeutic Intervention: Increased pain,Decreased high-level IADLs    Assessment: Pt completed 3 PT treatment sessions to address ongoing back pain. Education and training provided on home modalities as well as therapeutic exercises. Pt discharged to [facility] per request.      PLAN:   Frequency/Duration:  D/C PT POC                                              Patient Status:[] Continue/ Initiate plan of Care                          [x] Discharge PT. Recommend pt continue with HEP. [] Additional visits requested, Please re-certify for additional visits:                          [] Hold                                                     Signature: Electronically signed by Paolo Andrade PT on 9/21/2022 at 4:19 PM          If you have any questions or concerns, please don't hesitate to call.   Thank you for your referral.

## 2023-01-05 ENCOUNTER — OFFICE VISIT (OUTPATIENT)
Dept: FAMILY MEDICINE CLINIC | Age: 23
End: 2023-01-05
Payer: COMMERCIAL

## 2023-01-05 VITALS — BODY MASS INDEX: 20.02 KG/M2 | HEART RATE: 70 BPM | WEIGHT: 113 LBS | TEMPERATURE: 98.9 F | OXYGEN SATURATION: 98 %

## 2023-01-05 DIAGNOSIS — S69.92XA INJURY OF LEFT HAND, INITIAL ENCOUNTER: Primary | ICD-10-CM

## 2023-01-05 DIAGNOSIS — M79.89 SWELLING OF LEFT HAND: ICD-10-CM

## 2023-01-05 DIAGNOSIS — M79.642 LEFT HAND PAIN: ICD-10-CM

## 2023-01-05 PROCEDURE — G8484 FLU IMMUNIZE NO ADMIN: HCPCS | Performed by: NURSE PRACTITIONER

## 2023-01-05 PROCEDURE — 99213 OFFICE O/P EST LOW 20 MIN: CPT | Performed by: NURSE PRACTITIONER

## 2023-01-05 PROCEDURE — G8420 CALC BMI NORM PARAMETERS: HCPCS | Performed by: NURSE PRACTITIONER

## 2023-01-05 PROCEDURE — 1036F TOBACCO NON-USER: CPT | Performed by: NURSE PRACTITIONER

## 2023-01-05 PROCEDURE — G8427 DOCREV CUR MEDS BY ELIG CLIN: HCPCS | Performed by: NURSE PRACTITIONER

## 2023-01-05 ASSESSMENT — PATIENT HEALTH QUESTIONNAIRE - PHQ9
SUM OF ALL RESPONSES TO PHQ9 QUESTIONS 1 & 2: 0
SUM OF ALL RESPONSES TO PHQ QUESTIONS 1-9: 0
1. LITTLE INTEREST OR PLEASURE IN DOING THINGS: 0
SUM OF ALL RESPONSES TO PHQ QUESTIONS 1-9: 0
2. FEELING DOWN, DEPRESSED OR HOPELESS: 0

## 2023-01-05 ASSESSMENT — ENCOUNTER SYMPTOMS
EYE ITCHING: 0
APNEA: 0
CONSTIPATION: 0
NAUSEA: 0
EYE REDNESS: 0
SORE THROAT: 0
WHEEZING: 0
CHEST TIGHTNESS: 0
VOMITING: 0
COUGH: 0
DIARRHEA: 0
SHORTNESS OF BREATH: 0
ABDOMINAL DISTENTION: 0
SINUS PAIN: 0

## 2023-01-05 NOTE — PROGRESS NOTES
Subjective:      Patient ID: Yue Greer is a 22 y.o. female who presents today for:  Chief Complaint   Patient presents with    Other     Pt was playing volley ball and got stepped on         HPI    Pt here today after she reports she was playing Vball on Monday night and had her hand stepped on while going for a ball. PT has not tried any home tx's. Pt reports decreased ROM with grasping, and opening and pulling/ opening bags or handling objects. Pt reports working in [employer] office and she reports \"it hurts to open objects, bags and use utensils.     History reviewed. No pertinent past medical history.  History reviewed. No pertinent surgical history.  Social History     Socioeconomic History    Marital status: Single     Spouse name: Not on file    Number of children: Not on file    Years of education: Not on file    Highest education level: Not on file   Occupational History    Not on file   Tobacco Use    Smoking status: Never    Smokeless tobacco: Never   Substance and Sexual Activity    Alcohol use: Never    Drug use: Never    Sexual activity: Not on file   Other Topics Concern    Not on file   Social History Narrative    Not on file     Social Determinants of Health     Financial Resource Strain: Low Risk     Difficulty of Paying Living Expenses: Not hard at all   Food Insecurity: No Food Insecurity    Worried About Running Out of Food in the Last Year: Never true    Ran Out of Food in the Last Year: Never true   Transportation Needs: Not on file   Physical Activity: Not on file   Stress: Not on file   Social Connections: Not on file   Intimate Partner Violence: Not on file   Housing Stability: Not on file     Family History   Problem Relation Age of Onset    Cancer Mother     Cancer Father      Allergies   Allergen Reactions    Penicillins Rash         Review of Systems   Constitutional:  Negative for activity change, appetite change, chills, fatigue and fever.   HENT:  Negative for congestion,  drooling, ear pain, hearing loss, postnasal drip, sinus pain and sore throat. Eyes:  Negative for redness, itching and visual disturbance. Respiratory:  Negative for apnea, cough, chest tightness, shortness of breath and wheezing. Cardiovascular:  Negative for palpitations. Gastrointestinal:  Negative for abdominal distention, constipation, diarrhea, nausea and vomiting. Endocrine: Negative for heat intolerance. Genitourinary:  Negative for difficulty urinating, flank pain and genital sores. Musculoskeletal:  Positive for arthralgias, joint swelling and myalgias. Negative for gait problem and neck stiffness. Skin:  Negative for rash. Neurological:  Negative for tremors, seizures, facial asymmetry and headaches. Hematological:  Negative for adenopathy. Psychiatric/Behavioral:  Negative for behavioral problems, confusion and suicidal ideas. The patient is not hyperactive. All other systems reviewed and are negative. Objective:   Pulse 70   Temp 98.9 °F (37.2 °C)   Wt 113 lb (51.3 kg)   SpO2 98%   BMI 20.02 kg/m²     Physical Exam  Vitals and nursing note reviewed. Constitutional:       General: She is awake. She is not in acute distress. Appearance: Normal appearance. She is well-developed, well-groomed and normal weight. She is not ill-appearing, toxic-appearing or diaphoretic. HENT:      Head: Normocephalic and atraumatic. Right Ear: Tympanic membrane normal.      Left Ear: Tympanic membrane normal.      Nose: Nose normal. No rhinorrhea. Mouth/Throat:      Mouth: Mucous membranes are moist.      Pharynx: No oropharyngeal exudate or posterior oropharyngeal erythema. Eyes:      Extraocular Movements: Extraocular movements intact. Conjunctiva/sclera: Conjunctivae normal.      Pupils: Pupils are equal, round, and reactive to light. Cardiovascular:      Rate and Rhythm: Normal rate and regular rhythm. Pulses: Normal pulses.       Heart sounds: Normal heart sounds. No murmur heard. Pulmonary:      Effort: Pulmonary effort is normal. No tachypnea, bradypnea, accessory muscle usage or respiratory distress. Breath sounds: Normal breath sounds and air entry. No decreased air movement or transmitted upper airway sounds. No decreased breath sounds, wheezing or rhonchi. Comments: Lungs are clear on exam.   Chest:      Chest wall: No tenderness. Abdominal:      General: Abdomen is flat. Bowel sounds are normal. There is no distension. Palpations: Abdomen is soft. Tenderness: There is no abdominal tenderness. There is no left CVA tenderness, guarding or rebound. Musculoskeletal:         General: Tenderness and signs of injury present. No deformity. Left hand: Swelling (minimal) and tenderness (noted close to palm and anterior of 2nd joijnt of her hand) present. Decreased range of motion. Decreased strength. Normal capillary refill. Normal pulse. Hands:       Cervical back: Normal range of motion and neck supple. Left lower leg: No edema. Lymphadenopathy:      Cervical: No cervical adenopathy. Skin:     General: Skin is warm and dry. Capillary Refill: Capillary refill takes less than 2 seconds. Findings: No bruising, erythema or rash. Neurological:      General: No focal deficit present. Mental Status: She is alert and oriented to person, place, and time. Mental status is at baseline. Motor: No weakness. Coordination: Coordination normal.   Psychiatric:         Attention and Perception: Attention and perception normal.         Mood and Affect: Mood and affect normal.         Speech: Speech normal.         Behavior: Behavior normal. Behavior is cooperative. Thought Content: Thought content normal.         Judgment: Judgment normal.       Assessment:       Diagnosis Orders   1.  Injury of left hand, initial encounter  XR HAND LEFT (MIN 3 VIEWS)      2. Swelling of left hand  XR HAND LEFT (MIN 3 VIEWS) 3. Left hand pain  XR HAND LEFT (MIN 3 VIEWS)            Plan:      Orders Placed This Encounter   Procedures    XR HAND LEFT (MIN 3 VIEWS)     Standing Status:   Future     Number of Occurrences:   1     Standing Expiration Date:   1/5/2024     Order Specific Question:   Reason for exam:     Answer:   r/o injury, fracture     Pt here today and reports an injury after she \"was stepped on at [facility]. PT reports no at home treatments. Pt agreed to an X ray today. Pt aware she should RICE the injury and to take Motrin of the pain and assist with swelling. Pt verbalized understanding of the [facility] today. Pt left the [facility] today in stable condition. Discussed signs and symptoms which require immediate follow-up in ED/call to 911. Patient verbalized understanding. Apply a compressive ACE bandage. Rest and elevate the affected painful area. Apply cold compresses intermittently as needed. As pain recedes, begin normal activities slowly as tolerated. Call if symptoms persist.     No orders of the defined types were placed in this encounter. Pt left the [facility] today in stable condition. Apply a compressive ACE bandage. Rest and elevate the affected painful area. Apply cold compresses intermittently as needed. As pain recedes, begin normal activities slowly as tolerated. Call if symptoms persist.     Return if symptoms worsen or fail to improve. Reviewed with the patient: current clinical status, medications, activities and diet. Side effects, adverse effects of the medication prescribed today, as well as treatment plan and result expectations have been discussed with the patient who expresses understanding and desires to proceed. Close follow up to evaluate treatment results and for coordination of care. I have reviewed the patient's medical history in detail and updated the computerized patient record.       Danika Jacob, ALEXANDER - CNP